# Patient Record
Sex: FEMALE | Race: WHITE
[De-identification: names, ages, dates, MRNs, and addresses within clinical notes are randomized per-mention and may not be internally consistent; named-entity substitution may affect disease eponyms.]

---

## 2020-10-22 ENCOUNTER — HOSPITAL ENCOUNTER (INPATIENT)
Dept: HOSPITAL 54 - ER | Age: 75
LOS: 6 days | Discharge: NURSING, REHAB OR LONG-TERM CARE | DRG: 177 | End: 2020-10-28
Attending: INTERNAL MEDICINE
Payer: MEDICARE

## 2020-10-22 VITALS — HEIGHT: 61 IN | WEIGHT: 157 LBS | BODY MASS INDEX: 29.64 KG/M2

## 2020-10-22 DIAGNOSIS — U07.1: Primary | ICD-10-CM

## 2020-10-22 DIAGNOSIS — E66.9: ICD-10-CM

## 2020-10-22 DIAGNOSIS — D72.819: ICD-10-CM

## 2020-10-22 DIAGNOSIS — J12.89: ICD-10-CM

## 2020-10-22 DIAGNOSIS — R73.9: ICD-10-CM

## 2020-10-22 DIAGNOSIS — K21.9: ICD-10-CM

## 2020-10-22 DIAGNOSIS — J96.01: ICD-10-CM

## 2020-10-22 DIAGNOSIS — E87.6: ICD-10-CM

## 2020-10-22 DIAGNOSIS — N17.0: ICD-10-CM

## 2020-10-22 DIAGNOSIS — I10: ICD-10-CM

## 2020-10-22 LAB
ALBUMIN SERPL BCP-MCNC: 3.5 G/DL (ref 3.4–5)
ALP SERPL-CCNC: 66 U/L (ref 46–116)
ALT SERPL W P-5'-P-CCNC: 36 U/L (ref 12–78)
AST SERPL W P-5'-P-CCNC: 25 U/L (ref 15–37)
BASOPHILS # BLD AUTO: 0 /CMM (ref 0–0.2)
BASOPHILS NFR BLD AUTO: 0.4 % (ref 0–2)
BILIRUB DIRECT SERPL-MCNC: 0.1 MG/DL (ref 0–0.2)
BILIRUB SERPL-MCNC: 0.6 MG/DL (ref 0.2–1)
BUN SERPL-MCNC: 25 MG/DL (ref 7–18)
CALCIUM SERPL-MCNC: 8.8 MG/DL (ref 8.5–10.1)
CHLORIDE SERPL-SCNC: 101 MMOL/L (ref 98–107)
CO2 SERPL-SCNC: 29 MMOL/L (ref 21–32)
CREAT SERPL-MCNC: 0.8 MG/DL (ref 0.6–1.3)
EOSINOPHIL NFR BLD AUTO: 0.1 % (ref 0–6)
GLUCOSE SERPL-MCNC: 123 MG/DL (ref 74–106)
HCT VFR BLD AUTO: 39 % (ref 33–45)
HGB BLD-MCNC: 13.4 G/DL (ref 11.5–14.8)
LIPASE SERPL-CCNC: 108 U/L (ref 73–393)
LYMPHOCYTES NFR BLD AUTO: 0.5 /CMM (ref 0.8–4.8)
LYMPHOCYTES NFR BLD AUTO: 12.6 % (ref 20–44)
MCHC RBC AUTO-ENTMCNC: 34 G/DL (ref 31–36)
MCV RBC AUTO: 89 FL (ref 82–100)
MONOCYTES NFR BLD AUTO: 0.5 /CMM (ref 0.1–1.3)
MONOCYTES NFR BLD AUTO: 11.7 % (ref 2–12)
NEUTROPHILS # BLD AUTO: 3 /CMM (ref 1.8–8.9)
NEUTROPHILS NFR BLD AUTO: 75.2 % (ref 43–81)
PLATELET # BLD AUTO: 185 /CMM (ref 150–450)
POTASSIUM SERPL-SCNC: 2.9 MMOL/L (ref 3.5–5.1)
PROT SERPL-MCNC: 7.7 G/DL (ref 6.4–8.2)
RBC # BLD AUTO: 4.4 MIL/UL (ref 4–5.2)
SODIUM SERPL-SCNC: 139 MMOL/L (ref 136–145)
WBC NRBC COR # BLD AUTO: 4 K/UL (ref 4.3–11)

## 2020-10-22 PROCEDURE — C9803 HOPD COVID-19 SPEC COLLECT: HCPCS

## 2020-10-22 PROCEDURE — G0378 HOSPITAL OBSERVATION PER HR: HCPCS

## 2020-10-22 RX ADMIN — POTASSIUM CHLORIDE SCH MLS/HR: 200 INJECTION, SOLUTION INTRAVENOUS at 22:30

## 2020-10-22 RX ADMIN — POTASSIUM CHLORIDE SCH MLS/HR: 200 INJECTION, SOLUTION INTRAVENOUS at 21:15

## 2020-10-22 NOTE — NUR
PT AAOX4. BIBRA C/O MIDSTERNAL CP/EPIGASTRIC PAIN. PT WAS SEEN AT [facility] 2 DAYS 
AGO AND WAS D/C'D FOR GERD. + PRODUCTIVE COUGH. RR EVEN AND UNLABORED. PT 
PLACED IN BED 6 ON MONITOR AND PULSE OX. VSS. NO ACUTE DISTRESS NOTED. MD AT 
BEDSIDE FOR EVAL.

## 2020-10-23 VITALS — DIASTOLIC BLOOD PRESSURE: 75 MMHG | SYSTOLIC BLOOD PRESSURE: 140 MMHG

## 2020-10-23 VITALS — SYSTOLIC BLOOD PRESSURE: 155 MMHG | DIASTOLIC BLOOD PRESSURE: 68 MMHG

## 2020-10-23 VITALS — DIASTOLIC BLOOD PRESSURE: 67 MMHG | SYSTOLIC BLOOD PRESSURE: 129 MMHG

## 2020-10-23 VITALS — DIASTOLIC BLOOD PRESSURE: 52 MMHG | SYSTOLIC BLOOD PRESSURE: 123 MMHG

## 2020-10-23 VITALS — SYSTOLIC BLOOD PRESSURE: 146 MMHG | DIASTOLIC BLOOD PRESSURE: 80 MMHG

## 2020-10-23 VITALS — DIASTOLIC BLOOD PRESSURE: 66 MMHG | SYSTOLIC BLOOD PRESSURE: 139 MMHG

## 2020-10-23 LAB
ALBUMIN SERPL BCP-MCNC: 3.2 G/DL (ref 3.4–5)
ALP SERPL-CCNC: 63 U/L (ref 46–116)
ALT SERPL W P-5'-P-CCNC: 34 U/L (ref 12–78)
AST SERPL W P-5'-P-CCNC: 22 U/L (ref 15–37)
BASOPHILS # BLD AUTO: 0 /CMM (ref 0–0.2)
BASOPHILS NFR BLD AUTO: 0 % (ref 0–2)
BILIRUB SERPL-MCNC: 0.4 MG/DL (ref 0.2–1)
BUN SERPL-MCNC: 25 MG/DL (ref 7–18)
CALCIUM SERPL-MCNC: 8.5 MG/DL (ref 8.5–10.1)
CHLORIDE SERPL-SCNC: 104 MMOL/L (ref 98–107)
CHOLEST SERPL-MCNC: 107 MG/DL (ref ?–200)
CO2 SERPL-SCNC: 28 MMOL/L (ref 21–32)
CREAT SERPL-MCNC: 0.8 MG/DL (ref 0.6–1.3)
EOSINOPHIL NFR BLD AUTO: 0.1 % (ref 0–6)
FERRITIN SERPL-MCNC: 258 NG/ML (ref 8–388)
GLUCOSE SERPL-MCNC: 149 MG/DL (ref 74–106)
HCT VFR BLD AUTO: 39 % (ref 33–45)
HDLC SERPL-MCNC: 32 MG/DL (ref 40–60)
HGB BLD-MCNC: 12.9 G/DL (ref 11.5–14.8)
LDLC SERPL DIRECT ASSAY-MCNC: 61 MG/DL (ref 0–99)
LYMPHOCYTES NFR BLD AUTO: 0.4 /CMM (ref 0.8–4.8)
LYMPHOCYTES NFR BLD AUTO: 18.7 % (ref 20–44)
MAGNESIUM SERPL-MCNC: 3.1 MG/DL (ref 1.8–2.4)
MCHC RBC AUTO-ENTMCNC: 33 G/DL (ref 31–36)
MCV RBC AUTO: 90 FL (ref 82–100)
MONOCYTES NFR BLD AUTO: 0.2 /CMM (ref 0.1–1.3)
MONOCYTES NFR BLD AUTO: 8.6 % (ref 2–12)
NEUTROPHILS # BLD AUTO: 1.7 /CMM (ref 1.8–8.9)
NEUTROPHILS NFR BLD AUTO: 72.6 % (ref 43–81)
PHOSPHATE SERPL-MCNC: 3.8 MG/DL (ref 2.5–4.9)
PLATELET # BLD AUTO: 169 /CMM (ref 150–450)
POTASSIUM SERPL-SCNC: 3.7 MMOL/L (ref 3.5–5.1)
PROT SERPL-MCNC: 7.3 G/DL (ref 6.4–8.2)
RBC # BLD AUTO: 4.27 MIL/UL (ref 4–5.2)
SODIUM SERPL-SCNC: 142 MMOL/L (ref 136–145)
TRIGL SERPL-MCNC: 72 MG/DL (ref 30–150)
TSH SERPL DL<=0.005 MIU/L-ACNC: 0.15 UIU/ML (ref 0.36–3.74)
WBC NRBC COR # BLD AUTO: 2.3 K/UL (ref 4.3–11)

## 2020-10-23 RX ADMIN — ATORVASTATIN CALCIUM SCH MG: 10 TABLET, FILM COATED ORAL at 21:17

## 2020-10-23 RX ADMIN — Medication SCH MG: at 16:57

## 2020-10-23 RX ADMIN — DEXAMETHASONE SODIUM PHOSPHATE SCH MG: 10 INJECTION INTRAMUSCULAR; INTRAVENOUS at 08:53

## 2020-10-23 RX ADMIN — DEXTROSE MONOHYDRATE SCH MLS/HR: 50 INJECTION, SOLUTION INTRAVENOUS at 21:16

## 2020-10-23 RX ADMIN — Medication PRN MG: at 20:15

## 2020-10-23 RX ADMIN — METOPROLOL TARTRATE SCH MG: 50 TABLET, FILM COATED ORAL at 21:16

## 2020-10-23 RX ADMIN — Medication SCH MG: at 08:53

## 2020-10-23 RX ADMIN — ENOXAPARIN SODIUM SCH MG: 40 INJECTION SUBCUTANEOUS at 08:54

## 2020-10-23 RX ADMIN — PANTOPRAZOLE SODIUM SCH MG: 40 TABLET, DELAYED RELEASE ORAL at 08:56

## 2020-10-23 NOTE — NUR
TELE/RN OPENING NOTES:



RECEIVED PT. AWAKE IN BED. A/OX3-4. ABLE TO VERBALIZE NEEDS. NO SOB NOTED. BREATHING EVEN 
AND UNLABORED. TELE READING OF SR WITH HR OF 99. IV SITE OF LEFT WRIST #20G, RAC #20G, 
INTACT AND PATENT. SAFETY PRECAUTION IN PLACE AND MAINTAINED AT ALL TIMES. BED IN LOWEST 
LOCKED POSITION, HOB ELEVATED, SIDE RAILS UP X 2, CALL LIGHT WITHIN REACH. WILL CONTINUE TO 
MONITOR ACCORDINGLY.

## 2020-10-23 NOTE — NUR
TELE RN CLOSING NOTES



PT AWAKE IN BED AT THIS.  PT REMAINED STABLE THROUGHOUT SHIFT. PT KEPT CLEAN AND DRY. ALL 
CARE, NEEDS, MEDICATIONS AND TREATMENT ADMINISTERED AS ANTICIPATED PER PROTOCOL. SAFETY 
PRECAUTION IN PLACE AND MAINTAINED AT ALL TIMES. BED IN LOWEST LOCKED POSITION, HOB 
ELEVATED, SIDE RAILS UP X 2, CALL LIGHT WITHIN REACH. WILL ENDORSE TO NIGHT SHIFT NURSE FOR 
MARCIE

## 2020-10-23 NOTE — NUR
TELE RN NOTES

ADMITTED FROM ER THIS 74Y.O. FEMALE,ALERT,ORIENTED X3-4,BREATHING NON LABORED,DENIES CHEST 
PAIN.DIAGNOSIS OF COVID 19 AS REVEALED BY COVID RAPID TEST DONE IN ER.NO SKIN 
ISSUES,AMBULATE WITH STEADY GAIT.WITH SALINE LOCK LEFT WRIST AND RIGHT AC INTACT AND 
PATENT,IF POTASSIUM INFUSING WELL VI IV PUMP,WITH K LEVEL OF 2/9.DROPLET ISOLATION 
INITIATED.PLACE ON O2 2 LITERS TO KEEP O2 SAT ABOVE 90%.CALL LIGHT IN REACH,NEEDS 
ANTICIPATED.

## 2020-10-23 NOTE — NUR
TELE RN OPENING NOTES



RECEIVED PT AWAKE IN BED AT THIS TIME. PT IS A/O X3-4. NO SOB NOTED, NO S/S OF ANY ACUTE 
DISTRESS NOTED.  NO C/O PAIN AT THIS TIME. PT ON EXTERNAL TEL CARDIAC MONITOR READING SR IN 
THE 70S. RESPIRATIONS ARE EVEN AND UNLABORED WITH EQUAL RISE AND FALL IN CHEST. IV ACCESS 
NOTED IN LEFT WRIST G#20, INTACT, PATENT AND FLUSHING WELL. SAFETY PRECAUTION IN PLACE AND 
MAINTAINED AT ALL TIMES. BED IN LOWEST LOCKED POSITION, HOB ELEVATED, SIDE RAILS UP X 2, 
CALL LIGHT WITHIN REACH. WILL CONTINUE TO MONITOR

## 2020-10-23 NOTE — NUR
TELE/RN NOTES:



PT. PAIN LEVEL IMPROVED. PAIN MANAGEMENT EFFECTIVE. VSS. WILL CONTINUE TO MONITOR 
ACCORDINGLY.

## 2020-10-23 NOTE — NUR
PT NOTED WITH HOME MEDICATIONS AT BEDSIDE. MEDICATIONS SIGNED AND SEND TO PHARMACY. WILL 
CONTINUE TO MONITOR

## 2020-10-23 NOTE — NUR
TELE/RN NOTES:



PT. COMPLAINED OF PAIN 8 OUT OF 10, ACHING WHENEVER SHE COUGHS. REQUESTS FOR PAIN MEDICINE. 
ADMINISTERED MORPHINE 2MG IVP. AS ORDERED FOR PAIN. VSS. BP: 140/75 HR:105. WILL CONTINUE TO 
MONITOR PT ACCORDINGLY.

## 2020-10-23 NOTE — NUR
TELE RN NOTES

FAIRLY RESTED,SALINE LOCK X2 REMAINS PATENT.AFEBRILE,NO SOB,ISOLATION DROPLET FOR COVID 
POSITIVE.IN NO ACUTE DISTRESS.

## 2020-10-24 VITALS — DIASTOLIC BLOOD PRESSURE: 70 MMHG | SYSTOLIC BLOOD PRESSURE: 142 MMHG

## 2020-10-24 VITALS — SYSTOLIC BLOOD PRESSURE: 120 MMHG | DIASTOLIC BLOOD PRESSURE: 58 MMHG

## 2020-10-24 VITALS — SYSTOLIC BLOOD PRESSURE: 133 MMHG | DIASTOLIC BLOOD PRESSURE: 60 MMHG

## 2020-10-24 VITALS — DIASTOLIC BLOOD PRESSURE: 66 MMHG | SYSTOLIC BLOOD PRESSURE: 135 MMHG

## 2020-10-24 LAB
ALBUMIN SERPL BCP-MCNC: 3.2 G/DL (ref 3.4–5)
ALP SERPL-CCNC: 62 U/L (ref 46–116)
ALT SERPL W P-5'-P-CCNC: 56 U/L (ref 12–78)
AST SERPL W P-5'-P-CCNC: 38 U/L (ref 15–37)
BASE EXCESS BLDA CALC-SCNC: 4.3 MMOL/L
BASOPHILS # BLD AUTO: 0 /CMM (ref 0–0.2)
BASOPHILS NFR BLD AUTO: 0.1 % (ref 0–2)
BILIRUB DIRECT SERPL-MCNC: 0.1 MG/DL (ref 0–0.2)
BILIRUB SERPL-MCNC: 0.5 MG/DL (ref 0.2–1)
BUN SERPL-MCNC: 25 MG/DL (ref 7–18)
CALCIUM SERPL-MCNC: 8.8 MG/DL (ref 8.5–10.1)
CHLORIDE SERPL-SCNC: 102 MMOL/L (ref 98–107)
CO2 SERPL-SCNC: 28 MMOL/L (ref 21–32)
CREAT SERPL-MCNC: 0.8 MG/DL (ref 0.6–1.3)
CRP SERPL-MCNC: 3.9 MG/DL (ref 0–0.9)
DO-HGB MFR BLDA: 45.7 MMHG
EOSINOPHIL NFR BLD AUTO: 0 % (ref 0–6)
FERRITIN SERPL-MCNC: 262 NG/ML (ref 8–388)
GLUCOSE SERPL-MCNC: 161 MG/DL (ref 74–106)
HCT VFR BLD AUTO: 38 % (ref 33–45)
HGB BLD-MCNC: 13 G/DL (ref 11.5–14.8)
INHALED O2 CONCENTRATION: 21 %
LYMPHOCYTES NFR BLD AUTO: 0.7 /CMM (ref 0.8–4.8)
LYMPHOCYTES NFR BLD AUTO: 11.4 % (ref 20–44)
MCHC RBC AUTO-ENTMCNC: 34 G/DL (ref 31–36)
MCV RBC AUTO: 91 FL (ref 82–100)
MONOCYTES NFR BLD AUTO: 0.6 /CMM (ref 0.1–1.3)
MONOCYTES NFR BLD AUTO: 8.6 % (ref 2–12)
NEUTROPHILS # BLD AUTO: 5.1 /CMM (ref 1.8–8.9)
NEUTROPHILS NFR BLD AUTO: 79.9 % (ref 43–81)
PCO2 TEMP ADJ BLDA: 38.9 MMHG (ref 35–45)
PH TEMP ADJ BLDA: 7.48 [PH] (ref 7.35–7.45)
PLATELET # BLD AUTO: 190 /CMM (ref 150–450)
PO2 TEMP ADJ BLDA: 57.4 MMHG (ref 75–100)
POTASSIUM SERPL-SCNC: 3.3 MMOL/L (ref 3.5–5.1)
PROT SERPL-MCNC: 7.5 G/DL (ref 6.4–8.2)
RBC # BLD AUTO: 4.24 MIL/UL (ref 4–5.2)
SAO2 % BLDA: 90.6 % (ref 92–98.5)
SODIUM SERPL-SCNC: 141 MMOL/L (ref 136–145)
VENTILATION MODE VENT: (no result)
WBC NRBC COR # BLD AUTO: 6.4 K/UL (ref 4.3–11)

## 2020-10-24 RX ADMIN — DEXTROSE MONOHYDRATE SCH MLS/HR: 50 INJECTION, SOLUTION INTRAVENOUS at 20:58

## 2020-10-24 RX ADMIN — PANTOPRAZOLE SODIUM SCH MG: 40 TABLET, DELAYED RELEASE ORAL at 08:30

## 2020-10-24 RX ADMIN — Medication SCH MG: at 08:30

## 2020-10-24 RX ADMIN — ENOXAPARIN SODIUM SCH MG: 40 INJECTION SUBCUTANEOUS at 08:38

## 2020-10-24 RX ADMIN — Medication SCH MG: at 17:55

## 2020-10-24 RX ADMIN — ATORVASTATIN CALCIUM SCH MG: 10 TABLET, FILM COATED ORAL at 21:05

## 2020-10-24 RX ADMIN — DEXAMETHASONE SODIUM PHOSPHATE SCH MG: 10 INJECTION INTRAMUSCULAR; INTRAVENOUS at 08:39

## 2020-10-24 RX ADMIN — LOSARTAN POTASSIUM SCH MG: 50 TABLET, FILM COATED ORAL at 08:30

## 2020-10-24 RX ADMIN — FUROSEMIDE SCH MG: 40 TABLET ORAL at 08:31

## 2020-10-24 RX ADMIN — ZOLPIDEM TARTRATE PRN MG: 5 TABLET, FILM COATED ORAL at 01:49

## 2020-10-24 RX ADMIN — DEXAMETHASONE SODIUM PHOSPHATE SCH MG: 10 INJECTION INTRAMUSCULAR; INTRAVENOUS at 08:30

## 2020-10-24 RX ADMIN — ZOLPIDEM TARTRATE PRN MG: 5 TABLET, FILM COATED ORAL at 22:56

## 2020-10-24 RX ADMIN — METOPROLOL TARTRATE SCH MG: 50 TABLET, FILM COATED ORAL at 08:30

## 2020-10-24 RX ADMIN — METOPROLOL TARTRATE SCH MG: 50 TABLET, FILM COATED ORAL at 21:05

## 2020-10-24 NOTE — NUR
RN CLOSED NOTES



PATIENT IS A/O X 3-4 WITH NO SIGNS OF ACUTE DISTRESS ON 3L OF NASAL CANNULA. NO SIGNS OF 
PAIN AT THIS MOMENT. IV L WRIST #20G AND R AC#20 G.  PATIENT KEPT CLEAN AND DRY. ALL NEEDS, 
CARE, TREATMENT AND MEDICATIONS ADMINISTERED AS ANTICIPATED PER ORDER. SAFETY MEASURES ARE 
APPLIED, BED IS LOW AND LOCKED POSITION. SIDE RAILS UP X 2 FOR SAFETY. CALL LIGHT WITHIN 
REACH. WILL ENDORSE TO THE NEXT NIGHT SHIFT.

## 2020-10-24 NOTE — NUR
TELE/RN CLOSING NOTES:



PT. REMAINS IN BED. A/OX3-4. ABLE TO VERBALIZE NEEDS. NO SOB NOTED. BREATHING EVEN AND 
UNLABORED. TELE READING OF SR WITH HR OF 90. IV SITE OF LEFT WRIST #20G, RAC #20G, INTACT 
AND PATENT. SAFETY PRECAUTION IN PLACE AND MAINTAINED AT ALL TIMES. BED IN LOWEST LOCKED 
POSITION, HOB ELEVATED, SIDE RAILS UP X 2, CALL LIGHT WITHIN REACH. ALL DUE MEDS GIVEN AS 
ORDERED. ALL NURSING NEEDS MET AND RENDERED. WILL ENDORSE TO DAY SHIFT RN FOR MARCIE.

## 2020-10-24 NOTE — NUR
Tele RN opening notes

Received Pt from morning nurse. Pt is resting in bed comfortably. Pt is alert and 
orientedX3. Respiration is normal in 3 L NC. No SOB. No S/S of distress noted. Tele monitor 
showed SR Hr at 72 bpm. IV sites at L wrist # 20 is clean, intact and flushes well. IV sites 
at RAC# 20 is clean, intact and flushes well, and SL. Per am nurse Pt is still waiting for 
convalescent plasma sign by Dr. Nickerson. Contact precautions is maintained. Safety 
precautions is maintained. Bed at low position, brakes locked, side rails upX2 and call 
light is within reach. Will continue to monitor.

## 2020-10-24 NOTE — NUR
Tele RN notes

Pt is having insomnia and requesting sleeping pill. Administered ambien 5 mg/po as ordered 
for sleeping per Pt's request. Safety precautions is maintained. Will continue to monitor.

## 2020-10-24 NOTE — NUR
PATIENT AWAKE IN BED. A/OX3-4. ABLE TO VERBALIZE NEEDS. NO SOB NOTED. BREATHING EVEN AND 
UNLABORED. TELE READING OF SR WITH HR OF 99. IV SITE OF LEFT WRIST #20G, RAC #20G, INTACT 
AND PATENT. SAFETY PRECAUTION IN PLACE AND MAINTAINED AT ALL TIMES. BED IN LOWEST LOCKED 
POSITION, HOB ELEVATED, SIDE RAILS UP X 2, CALL LIGHT WITHIN REACH. WILL CONTINUE TO MONITOR 
ACCORDINGLY.

## 2020-10-24 NOTE — NUR
TELE/RN NOTES:



PT REQUESTED FOR SOMETHING TO HELP SLEEP. DR. DELANEY ORDERED AMBIEN 5MG PO HS PRN. 
ADMINISTERED, AND TOLERATED WELL. VSS. WILL CONTINUE TO MONITOR ACCORDINGLY.

## 2020-10-24 NOTE — NUR
Tele RN notes

Pt's family named Jennifer called and asked about Pt's condition. Informed Jennifer that Pt's 
is resting and stable. Afebrile. No SOB. No S/S of distress noted. Pt's family Jennifer 
appreciate the info and verbalize understanding.

## 2020-10-24 NOTE — NUR
RECEIVED PATIENT FROM NILESH DALAL FOR MARCIE

PATIENT IS A/O X 3-4 WITH NO SIGNS OF ACUTE DISTRESS ON 2L OF NASAL CANNULA. TELE MONITOR SR 
NOTED. NO C/O OF PAIN AT THIS MOMENT. IV L WRIST #20G  AND R AC #20G INTACT.  SAFETY 
MEASURES ARE APPLIED, BED IS LOW AND LOCKED POSITION. SIDE RAILS UP X 2 FOR SAFETY. CALL 
LIGHT WITHIN REACH. WILL CONTINUE TO MONITOR.

## 2020-10-25 VITALS — SYSTOLIC BLOOD PRESSURE: 134 MMHG | DIASTOLIC BLOOD PRESSURE: 45 MMHG

## 2020-10-25 VITALS — DIASTOLIC BLOOD PRESSURE: 59 MMHG | SYSTOLIC BLOOD PRESSURE: 121 MMHG

## 2020-10-25 VITALS — SYSTOLIC BLOOD PRESSURE: 105 MMHG | DIASTOLIC BLOOD PRESSURE: 60 MMHG

## 2020-10-25 VITALS — DIASTOLIC BLOOD PRESSURE: 57 MMHG | SYSTOLIC BLOOD PRESSURE: 125 MMHG

## 2020-10-25 VITALS — SYSTOLIC BLOOD PRESSURE: 120 MMHG | DIASTOLIC BLOOD PRESSURE: 52 MMHG

## 2020-10-25 VITALS — DIASTOLIC BLOOD PRESSURE: 73 MMHG | SYSTOLIC BLOOD PRESSURE: 136 MMHG

## 2020-10-25 LAB
ALBUMIN SERPL BCP-MCNC: 3 G/DL (ref 3.4–5)
ALP SERPL-CCNC: 54 U/L (ref 46–116)
ALT SERPL W P-5'-P-CCNC: 51 U/L (ref 12–78)
AST SERPL W P-5'-P-CCNC: 34 U/L (ref 15–37)
BASOPHILS # BLD AUTO: 0 /CMM (ref 0–0.2)
BASOPHILS NFR BLD AUTO: 0.1 % (ref 0–2)
BILIRUB DIRECT SERPL-MCNC: 0.1 MG/DL (ref 0–0.2)
BILIRUB SERPL-MCNC: 0.3 MG/DL (ref 0.2–1)
BUN SERPL-MCNC: 25 MG/DL (ref 7–18)
CALCIUM SERPL-MCNC: 8.7 MG/DL (ref 8.5–10.1)
CHLORIDE SERPL-SCNC: 103 MMOL/L (ref 98–107)
CO2 SERPL-SCNC: 32 MMOL/L (ref 21–32)
CREAT SERPL-MCNC: 0.6 MG/DL (ref 0.6–1.3)
EOSINOPHIL NFR BLD AUTO: 0 % (ref 0–6)
GLUCOSE SERPL-MCNC: 131 MG/DL (ref 74–106)
HCT VFR BLD AUTO: 37 % (ref 33–45)
HGB BLD-MCNC: 12.4 G/DL (ref 11.5–14.8)
LYMPHOCYTES NFR BLD AUTO: 0.7 /CMM (ref 0.8–4.8)
LYMPHOCYTES NFR BLD AUTO: 15.1 % (ref 20–44)
MAGNESIUM SERPL-MCNC: 2.2 MG/DL (ref 1.8–2.4)
MCHC RBC AUTO-ENTMCNC: 34 G/DL (ref 31–36)
MCV RBC AUTO: 90 FL (ref 82–100)
MONOCYTES NFR BLD AUTO: 0.5 /CMM (ref 0.1–1.3)
MONOCYTES NFR BLD AUTO: 10.1 % (ref 2–12)
NEUTROPHILS # BLD AUTO: 3.6 /CMM (ref 1.8–8.9)
NEUTROPHILS NFR BLD AUTO: 74.7 % (ref 43–81)
PHOSPHATE SERPL-MCNC: 2.7 MG/DL (ref 2.5–4.9)
PLATELET # BLD AUTO: 181 /CMM (ref 150–450)
POTASSIUM SERPL-SCNC: 3.1 MMOL/L (ref 3.5–5.1)
PROT SERPL-MCNC: 7.1 G/DL (ref 6.4–8.2)
RBC # BLD AUTO: 4.06 MIL/UL (ref 4–5.2)
SODIUM SERPL-SCNC: 143 MMOL/L (ref 136–145)
WBC NRBC COR # BLD AUTO: 4.8 K/UL (ref 4.3–11)

## 2020-10-25 RX ADMIN — Medication PRN MG: at 22:48

## 2020-10-25 RX ADMIN — DEXAMETHASONE SODIUM PHOSPHATE SCH MG: 10 INJECTION INTRAMUSCULAR; INTRAVENOUS at 09:05

## 2020-10-25 RX ADMIN — ENOXAPARIN SODIUM SCH MG: 40 INJECTION SUBCUTANEOUS at 09:09

## 2020-10-25 RX ADMIN — Medication SCH MG: at 16:51

## 2020-10-25 RX ADMIN — PANTOPRAZOLE SODIUM SCH MG: 40 TABLET, DELAYED RELEASE ORAL at 09:10

## 2020-10-25 RX ADMIN — ATORVASTATIN CALCIUM SCH MG: 10 TABLET, FILM COATED ORAL at 21:11

## 2020-10-25 RX ADMIN — Medication PRN MG: at 02:16

## 2020-10-25 RX ADMIN — Medication SCH MG: at 09:05

## 2020-10-25 RX ADMIN — METOPROLOL TARTRATE SCH MG: 50 TABLET, FILM COATED ORAL at 09:07

## 2020-10-25 RX ADMIN — FUROSEMIDE SCH MG: 40 TABLET ORAL at 09:08

## 2020-10-25 RX ADMIN — POTASSIUM CHLORIDE SCH MEQ: 1500 TABLET, EXTENDED RELEASE ORAL at 09:06

## 2020-10-25 RX ADMIN — POTASSIUM CHLORIDE SCH MEQ: 1500 TABLET, EXTENDED RELEASE ORAL at 10:23

## 2020-10-25 RX ADMIN — LOSARTAN POTASSIUM SCH MG: 50 TABLET, FILM COATED ORAL at 09:08

## 2020-10-25 RX ADMIN — SODIUM CHLORIDE SCH MLS/HR: 9 INJECTION, SOLUTION INTRAVENOUS at 13:49

## 2020-10-25 RX ADMIN — METOPROLOL TARTRATE SCH MG: 50 TABLET, FILM COATED ORAL at 21:12

## 2020-10-25 NOTE — NUR
TELE RN NOTES



RECEIVED PATIENT AWAKE ALERT ORIENTED X4. NO SIGNS OF ACUTE RESPIRATORY DISTRESS NOTED,O2 AT 
2LPM VIA NASAL CANNULA TOLERATING WELL.  DENIES ANY PAIN OR DISCOMFORT AT THIS TIME, 
ASSISTED TO TOILET, AMBULATORY WITH STANDBY ASSIST. PERIPHERAL IV ACCESS INTACT AND PATENT 
ON HER LEFT WRIST AND RIGHT AC SL. TELE MONITOR READS SINUS RHYTHM. SAFETY MEASURES IN 
PLACE. ASPIRATION PRECAUTION EMPHASIZED. ALL NEEDS ANTICIPATED. WILL CONTINUE TO MONITOR 
ACCORDINGLY.

## 2020-10-25 NOTE — NUR
prn morphine:

pt c/o generalized pain 10/10 requesting for morphine, prn morphine 2mg ivp administered to 
pt at this time. will continue to monitor and reassess pt.

## 2020-10-25 NOTE — NUR
RN Opening note



Received patient in bed resting AO x 3-4, able to resods all stimuli. Pt does no c/o pain or 
discomfort at this time, skin is warm to touch, keep clean/dry intact IV site on left wrist 
20g with SL. Respiratory even and unlabored on room air. Kept locked bed with lowest 
position and elevated HOB for ensure airway. Call light within reach, will continue to 
monitor.

-------------------------------------------------------------------------------

Addendum: 10/25/20 at 1007 by JOANN GARCIA RN

-------------------------------------------------------------------------------

Error

## 2020-10-25 NOTE — NUR
RN Closing note



Patient in bed resting, does no appears pain pr distress, skin is warm to touch, keep 
clean/dry, intact IV site. Pt still waiting Plasma from blood bank for transfusion. 
Respiratory even and unlabored with oxygen at 2LPM, no respiratory distress observed. Kept 
locked bed with lowest position and elevated HOB for ensure airway and aspiration 
precaution. Niece/Jennifer wants to be on face sheet/person to notify and patient wish also 
but  is off today, will endorse night shift. Call light within reach, all needs 
met.

## 2020-10-25 NOTE — NUR
Tele RN closing notes

Pt is resting in bed comfortably. Pt is alert and orientedX3. Respiration is normal in 2 L 
NC. No SOB. No S/S of distress noted. VS is stable. Afebrile. Routine meds were given as 
ordered. Tele monitor showed SR 77.  IV sites at L wrist # 20 is clean, intact and flushes 
well. IV sites at RAC# 20 is clean, intact and flushes well, and SL.  Kept Pt clean, dry and 
comfortable. Contact precautions is maintained. Safety precautions is maintained. Bed at low 
position, brakes locked, side rails upX2, HOB elevated and call light is within reach. Will 
endorse to morning nurse for MARCIE.

## 2020-10-25 NOTE — NUR
RN Opening note



Received patient in bed resting AO x 3-4, able to resods all stimuli. Pt does no c/o pain or 
discomfort at this time, skin is warm to touch, keep clean/dry intact IV site on left wrist 
20g with SL. Respiratory even and unlabored with oxygen at 2LPM. Kept locked bed with lowest 
position and elevated HOB for ensure airway. Call light within reach, will continue to 
monitor.

## 2020-10-25 NOTE — NUR
Patient noticed potassium level 3.1, Dr. Nickerson made aware and received order potassium 
80mEq po x 1. Noted and carried out.

## 2020-10-26 VITALS — SYSTOLIC BLOOD PRESSURE: 124 MMHG | DIASTOLIC BLOOD PRESSURE: 68 MMHG

## 2020-10-26 VITALS — DIASTOLIC BLOOD PRESSURE: 64 MMHG | SYSTOLIC BLOOD PRESSURE: 127 MMHG

## 2020-10-26 VITALS — DIASTOLIC BLOOD PRESSURE: 73 MMHG | SYSTOLIC BLOOD PRESSURE: 129 MMHG

## 2020-10-26 VITALS — SYSTOLIC BLOOD PRESSURE: 121 MMHG | DIASTOLIC BLOOD PRESSURE: 87 MMHG

## 2020-10-26 VITALS — DIASTOLIC BLOOD PRESSURE: 70 MMHG | SYSTOLIC BLOOD PRESSURE: 123 MMHG

## 2020-10-26 VITALS — DIASTOLIC BLOOD PRESSURE: 60 MMHG | SYSTOLIC BLOOD PRESSURE: 133 MMHG

## 2020-10-26 VITALS — DIASTOLIC BLOOD PRESSURE: 68 MMHG | SYSTOLIC BLOOD PRESSURE: 124 MMHG

## 2020-10-26 VITALS — DIASTOLIC BLOOD PRESSURE: 59 MMHG | SYSTOLIC BLOOD PRESSURE: 152 MMHG

## 2020-10-26 LAB
ALBUMIN SERPL BCP-MCNC: 2.8 G/DL (ref 3.4–5)
ALP SERPL-CCNC: 52 U/L (ref 46–116)
ALT SERPL W P-5'-P-CCNC: 48 U/L (ref 12–78)
AST SERPL W P-5'-P-CCNC: 26 U/L (ref 15–37)
BASOPHILS # BLD AUTO: 0 /CMM (ref 0–0.2)
BASOPHILS NFR BLD AUTO: 0.1 % (ref 0–2)
BILIRUB DIRECT SERPL-MCNC: 0.1 MG/DL (ref 0–0.2)
BILIRUB SERPL-MCNC: 0.4 MG/DL (ref 0.2–1)
BUN SERPL-MCNC: 28 MG/DL (ref 7–18)
BUN SERPL-MCNC: 30 MG/DL (ref 7–18)
CALCIUM SERPL-MCNC: 8.6 MG/DL (ref 8.5–10.1)
CALCIUM SERPL-MCNC: 8.8 MG/DL (ref 8.5–10.1)
CHLORIDE SERPL-SCNC: 106 MMOL/L (ref 98–107)
CHLORIDE SERPL-SCNC: 107 MMOL/L (ref 98–107)
CO2 SERPL-SCNC: 30 MMOL/L (ref 21–32)
CO2 SERPL-SCNC: 30 MMOL/L (ref 21–32)
CREAT SERPL-MCNC: 0.5 MG/DL (ref 0.6–1.3)
CREAT SERPL-MCNC: 0.8 MG/DL (ref 0.6–1.3)
EOSINOPHIL NFR BLD AUTO: 0.3 % (ref 0–6)
GLUCOSE SERPL-MCNC: 105 MG/DL (ref 74–106)
GLUCOSE SERPL-MCNC: 161 MG/DL (ref 74–106)
HCT VFR BLD AUTO: 39 % (ref 33–45)
HGB BLD-MCNC: 13 G/DL (ref 11.5–14.8)
LYMPHOCYTES NFR BLD AUTO: 0.6 /CMM (ref 0.8–4.8)
LYMPHOCYTES NFR BLD AUTO: 11.6 % (ref 20–44)
MCHC RBC AUTO-ENTMCNC: 34 G/DL (ref 31–36)
MCV RBC AUTO: 90 FL (ref 82–100)
MONOCYTES NFR BLD AUTO: 0.4 /CMM (ref 0.1–1.3)
MONOCYTES NFR BLD AUTO: 7.6 % (ref 2–12)
NEUTROPHILS # BLD AUTO: 4.1 /CMM (ref 1.8–8.9)
NEUTROPHILS NFR BLD AUTO: 80.4 % (ref 43–81)
PLATELET # BLD AUTO: 182 /CMM (ref 150–450)
POTASSIUM SERPL-SCNC: 3.5 MMOL/L (ref 3.5–5.1)
POTASSIUM SERPL-SCNC: 3.7 MMOL/L (ref 3.5–5.1)
PROT SERPL-MCNC: 7 G/DL (ref 6.4–8.2)
RBC # BLD AUTO: 4.29 MIL/UL (ref 4–5.2)
SODIUM SERPL-SCNC: 142 MMOL/L (ref 136–145)
SODIUM SERPL-SCNC: 145 MMOL/L (ref 136–145)
WBC NRBC COR # BLD AUTO: 5.1 K/UL (ref 4.3–11)

## 2020-10-26 PROCEDURE — XW13325 TRANSFUSION OF CONVALESCENT PLASMA (NONAUTOLOGOUS) INTO PERIPHERAL VEIN, PERCUTANEOUS APPROACH, NEW TECHNOLOGY GROUP 5: ICD-10-PCS

## 2020-10-26 RX ADMIN — Medication SCH MG: at 17:00

## 2020-10-26 RX ADMIN — METOPROLOL TARTRATE SCH MG: 50 TABLET, FILM COATED ORAL at 23:53

## 2020-10-26 RX ADMIN — ATORVASTATIN CALCIUM SCH MG: 10 TABLET, FILM COATED ORAL at 23:53

## 2020-10-26 RX ADMIN — METOPROLOL TARTRATE SCH MG: 50 TABLET, FILM COATED ORAL at 08:40

## 2020-10-26 RX ADMIN — Medication SCH MG: at 08:39

## 2020-10-26 RX ADMIN — SODIUM CHLORIDE SCH MLS/HR: 9 INJECTION, SOLUTION INTRAVENOUS at 14:48

## 2020-10-26 RX ADMIN — DEXAMETHASONE SODIUM PHOSPHATE SCH MG: 10 INJECTION INTRAMUSCULAR; INTRAVENOUS at 08:39

## 2020-10-26 RX ADMIN — ZOLPIDEM TARTRATE PRN MG: 5 TABLET, FILM COATED ORAL at 23:53

## 2020-10-26 RX ADMIN — ENOXAPARIN SODIUM SCH MG: 40 INJECTION SUBCUTANEOUS at 08:41

## 2020-10-26 RX ADMIN — FUROSEMIDE SCH MG: 40 TABLET ORAL at 08:40

## 2020-10-26 RX ADMIN — LOSARTAN POTASSIUM SCH MG: 50 TABLET, FILM COATED ORAL at 08:40

## 2020-10-26 RX ADMIN — PANTOPRAZOLE SODIUM SCH MG: 40 TABLET, DELAYED RELEASE ORAL at 07:05

## 2020-10-26 RX ADMIN — ZOLPIDEM TARTRATE PRN MG: 5 TABLET, FILM COATED ORAL at 00:47

## 2020-10-26 RX ADMIN — Medication SCH MG: at 17:23

## 2020-10-26 NOTE — NUR
MS2/RN

TRANSFUSION FINISHED AT 2100, VITAL SIGNS STABLE, AFEBRILE, NO REACTION NOTED, WILL MONITOR.

## 2020-10-26 NOTE — NUR
TELE RN NOTES



ALL NEEDS ATTENDED AND MET. ABLE TO REST AND SLEPT AT INTERVALS,  PATIENTALERT ORIENTED X4. 
NO SIGNS OF ACUTE RESPIRATORY DISTRESS NOTED,O2 AT 2LPM VIA NASAL CANNULA TOLERATING WELL.  
DENIES ANY PAIN OR DISCOMFORT AT THIS TIME, ASSISTED TO TOILET, AMBULATORY WITH STANDBY 
ASSIST. PERIPHERAL IV ACCESS INTACT AND PATENT ON HER LEFT WRIST AND RIGHT AC SL. TELE 
MONITOR READS SINUS RHYTHM. SAFETY MEASURES IN PLACE. ASPIRATION PRECAUTION EMPHASIZED. ALL 
NEEDS ANTICIPATED. WILL ENDORSE TO AM NURSE FOR CONTINUITY OF CARE. FOR PLASMA TRANSFUSION 
ONCE AVAILABLE.

## 2020-10-26 NOTE — NUR
TELE RN CLOSING NOTES

Patient resting in bed. A/O x 3. VS stable with no acute distress. Breathing even and 
unlabored on 2LPM via NC with no respiratory distress. Denies pain. No signs and symptoms of 
pain. Telemonitor in place and patent. 20g PIV on left wrist intact, patent and flushing 
well. 20g PIV on RAC intact, patent and flushing well with Convalescent Plasma infusing at 
100ml/hr. Safety precautions in place. Bed locked and set to lowest position with side rails 
x 2 up. All needs rendered at this time. Call light within reach. Will endorse plan of care 
to oncoming shift.

## 2020-10-26 NOTE — NUR
MS2/RN

RECEIVED PATIENT AWAKE, ALERT, ORIENTED, COMFORTABLE, NO C/O PAIN, NO DISTRESS NOTED, 
CONVALESCENT PLASMA INFUSING, ASSISTED TO THE BATHROOM AND BACK TO BED, CALL LIGHT IN REACH. 
WILL MONITOR.

## 2020-10-26 NOTE — NUR
TELE RN OPENING NOTES

Received Patient resting in bed. A/O x 3. VS stable with no acute distress. Breathing even 
and unlabored on 2LPM via NC with no respiratory distress. Denies pain. No signs and 
symptoms of pain. Telemonitor in place and patent reading SR with HR-82. 20g PIV on left 
wrist intact, patent and flushing well. 20g PIV on RAC intact, patent and flushing well. 
Safety precautions in place. Bed locked and set to lowest position with side rails x 2 up. 
All needs rendered at this time. Call light within reach. Will continue to monitor.

## 2020-10-27 VITALS — SYSTOLIC BLOOD PRESSURE: 140 MMHG | DIASTOLIC BLOOD PRESSURE: 68 MMHG

## 2020-10-27 LAB
ALBUMIN SERPL BCP-MCNC: 2.9 G/DL (ref 3.4–5)
ALP SERPL-CCNC: 53 U/L (ref 46–116)
ALT SERPL W P-5'-P-CCNC: 41 U/L (ref 12–78)
AST SERPL W P-5'-P-CCNC: 21 U/L (ref 15–37)
BASOPHILS # BLD AUTO: 0.2 /CMM (ref 0–0.2)
BASOPHILS NFR BLD AUTO: 4.4 % (ref 0–2)
BILIRUB DIRECT SERPL-MCNC: 0.1 MG/DL (ref 0–0.2)
BILIRUB SERPL-MCNC: 0.5 MG/DL (ref 0.2–1)
BUN SERPL-MCNC: 28 MG/DL (ref 7–18)
CALCIUM SERPL-MCNC: 9 MG/DL (ref 8.5–10.1)
CHLORIDE SERPL-SCNC: 106 MMOL/L (ref 98–107)
CO2 SERPL-SCNC: 31 MMOL/L (ref 21–32)
CREAT SERPL-MCNC: 0.5 MG/DL (ref 0.6–1.3)
EOSINOPHIL NFR BLD AUTO: 4.9 % (ref 0–6)
GLUCOSE SERPL-MCNC: 107 MG/DL (ref 74–106)
HCT VFR BLD AUTO: 37 % (ref 33–45)
HGB BLD-MCNC: 12.4 G/DL (ref 11.5–14.8)
LYMPHOCYTES NFR BLD AUTO: 0.6 /CMM (ref 0.8–4.8)
LYMPHOCYTES NFR BLD AUTO: 12.6 % (ref 20–44)
MCHC RBC AUTO-ENTMCNC: 34 G/DL (ref 31–36)
MCV RBC AUTO: 90 FL (ref 82–100)
MONOCYTES NFR BLD AUTO: 0.2 /CMM (ref 0.1–1.3)
MONOCYTES NFR BLD AUTO: 4.7 % (ref 2–12)
NEUTROPHILS # BLD AUTO: 3.5 /CMM (ref 1.8–8.9)
NEUTROPHILS NFR BLD AUTO: 73.4 % (ref 43–81)
PLATELET # BLD AUTO: 193 /CMM (ref 150–450)
POTASSIUM SERPL-SCNC: 3 MMOL/L (ref 3.5–5.1)
PROT SERPL-MCNC: 7.1 G/DL (ref 6.4–8.2)
RBC # BLD AUTO: 4.1 MIL/UL (ref 4–5.2)
SODIUM SERPL-SCNC: 145 MMOL/L (ref 136–145)
WBC NRBC COR # BLD AUTO: 4.8 K/UL (ref 4.3–11)

## 2020-10-27 RX ADMIN — Medication SCH MG: at 16:48

## 2020-10-27 RX ADMIN — ENOXAPARIN SODIUM SCH MG: 40 INJECTION SUBCUTANEOUS at 09:21

## 2020-10-27 RX ADMIN — METOPROLOL TARTRATE SCH MG: 50 TABLET, FILM COATED ORAL at 09:00

## 2020-10-27 RX ADMIN — PANTOPRAZOLE SODIUM SCH MG: 40 TABLET, DELAYED RELEASE ORAL at 09:24

## 2020-10-27 RX ADMIN — DEXAMETHASONE SCH MG: 4 TABLET ORAL at 09:19

## 2020-10-27 RX ADMIN — FUROSEMIDE SCH MG: 40 TABLET ORAL at 09:19

## 2020-10-27 RX ADMIN — SODIUM CHLORIDE SCH MLS/HR: 9 INJECTION, SOLUTION INTRAVENOUS at 13:43

## 2020-10-27 RX ADMIN — METOPROLOL TARTRATE SCH MG: 50 TABLET, FILM COATED ORAL at 21:46

## 2020-10-27 RX ADMIN — LOSARTAN POTASSIUM SCH MG: 50 TABLET, FILM COATED ORAL at 09:00

## 2020-10-27 RX ADMIN — POTASSIUM CHLORIDE SCH MEQ: 1500 TABLET, EXTENDED RELEASE ORAL at 11:48

## 2020-10-27 RX ADMIN — POTASSIUM CHLORIDE SCH MEQ: 1500 TABLET, EXTENDED RELEASE ORAL at 09:19

## 2020-10-27 RX ADMIN — ATORVASTATIN CALCIUM SCH MG: 10 TABLET, FILM COATED ORAL at 21:45

## 2020-10-27 RX ADMIN — Medication SCH MG: at 09:24

## 2020-10-27 NOTE — NUR
RN OPENING NOTES



PATIENT RECEIVED RESTING IN BED A/O X 4. ON 2L OF O2 WITH BREATHING EVEN AND UNLABORED, NO 
SOB NOTED. NO SIGNS OF ACUTE DISTRESS. NO COMPLAINTS OF PAIN OR DISCOMFORT AT THE MOMENT. IV 
LOCATED ON R [facility]. SAFETY PRECAUTIONS IN PLACE WITH BED IN LOWEST POSITION, CALL LIGHT 
WITHIN REACH, BREAKS ON, SIDE RAILS UP. WILL CONTINUE TO MONITOR THROUGHOUT THE NIGHT.

## 2020-10-27 NOTE — NUR
MS2/RN

PATIENT IS SLEEPING AT THIS TIME, APPEAR COMFORTABLE, NO SIGNS OF DISTRESS NOTED, CALL LIGHT 
IN REACH, WILL CONTINUE TO MONITOR.

## 2020-10-27 NOTE — NUR
ms rn

was seen by dr. denney w/ order to go home today,but patient prefers to go to rehab,case 
manager notified.

## 2020-10-27 NOTE — NUR
spoke to the patient via patient's hospital line. Patient is alert and 
oriented x4, patient speaking slowly and uneven. Patient confirmed that Jennifer is patients 
eliz. SW confirmed with patient if information can be released to Jennifer, patient provided 
verbal consent yes. Patient informed this SW that Jennifer has been calling for updates 
prior to today and that patient has been in contact with Jennifer providing updates. SW asked 
patient if she is aware of discharge, patient stated that she is and was told that she is 
going to a skilled nursing facility. Patient is aware of discharge plan and in agreement of 
this plan. SW to follow-up with case management team regarding discharge and providing 
information to patient eliz Rodriguez [phone]. 



SW to remain available for all needs regarding this patient.

## 2020-10-27 NOTE — NUR
received a call from patient's eliz Rodriguez [phone]. Per Jennifer, 
patient is not ready to be discharged. SW to provide this message to case management to 
provide more information regarding patient's discharge plan.

## 2020-10-27 NOTE — NUR
ms rn

received on bed, awake,alert,oriented x 4,not in any form of distress, respirations even and 
unlabored,no sob noted, lungs have ronchi bilateraly,abdomen soft,positive bowel sounds, 
denies pain at this time,all needs attended.

## 2020-10-27 NOTE — NUR
MS2/RN

PATIENT IS STILL SLEEPING, EASILY AROUSABLE, NO  SIGNS OF DISTRESS NOTED, CALL LIGHT IN 
REACH, ALL NEEDS ATTENDED AT THIS TIME, WILL CONTINUE TO MONITOR.

## 2020-10-28 VITALS — SYSTOLIC BLOOD PRESSURE: 131 MMHG | DIASTOLIC BLOOD PRESSURE: 64 MMHG

## 2020-10-28 LAB
ALBUMIN SERPL BCP-MCNC: 2.8 G/DL (ref 3.4–5)
ALP SERPL-CCNC: 51 U/L (ref 46–116)
ALT SERPL W P-5'-P-CCNC: 38 U/L (ref 12–78)
AST SERPL W P-5'-P-CCNC: 20 U/L (ref 15–37)
BASOPHILS # BLD AUTO: 0 /CMM (ref 0–0.2)
BASOPHILS NFR BLD AUTO: 0.1 % (ref 0–2)
BILIRUB DIRECT SERPL-MCNC: 0.2 MG/DL (ref 0–0.2)
BILIRUB SERPL-MCNC: 0.6 MG/DL (ref 0.2–1)
BUN SERPL-MCNC: 23 MG/DL (ref 7–18)
CALCIUM SERPL-MCNC: 8.8 MG/DL (ref 8.5–10.1)
CHLORIDE SERPL-SCNC: 106 MMOL/L (ref 98–107)
CO2 SERPL-SCNC: 29 MMOL/L (ref 21–32)
CREAT SERPL-MCNC: 0.7 MG/DL (ref 0.6–1.3)
EOSINOPHIL NFR BLD AUTO: 1.8 % (ref 0–6)
GLUCOSE SERPL-MCNC: 192 MG/DL (ref 74–106)
HCT VFR BLD AUTO: 37 % (ref 33–45)
HGB BLD-MCNC: 12.2 G/DL (ref 11.5–14.8)
LYMPHOCYTES NFR BLD AUTO: 0.7 /CMM (ref 0.8–4.8)
LYMPHOCYTES NFR BLD AUTO: 13.3 % (ref 20–44)
MCHC RBC AUTO-ENTMCNC: 34 G/DL (ref 31–36)
MCV RBC AUTO: 90 FL (ref 82–100)
MONOCYTES NFR BLD AUTO: 0.4 /CMM (ref 0.1–1.3)
MONOCYTES NFR BLD AUTO: 8.1 % (ref 2–12)
NEUTROPHILS # BLD AUTO: 3.8 /CMM (ref 1.8–8.9)
NEUTROPHILS NFR BLD AUTO: 76.7 % (ref 43–81)
PLATELET # BLD AUTO: 185 /CMM (ref 150–450)
POTASSIUM SERPL-SCNC: 3.7 MMOL/L (ref 3.5–5.1)
PROT SERPL-MCNC: 6.7 G/DL (ref 6.4–8.2)
RBC # BLD AUTO: 4.03 MIL/UL (ref 4–5.2)
SODIUM SERPL-SCNC: 144 MMOL/L (ref 136–145)
WBC NRBC COR # BLD AUTO: 5 K/UL (ref 4.3–11)

## 2020-10-28 RX ADMIN — POTASSIUM CHLORIDE SCH MEQ: 1500 TABLET, EXTENDED RELEASE ORAL at 09:00

## 2020-10-28 RX ADMIN — FUROSEMIDE SCH MG: 40 TABLET ORAL at 08:56

## 2020-10-28 RX ADMIN — DEXAMETHASONE SCH MG: 4 TABLET ORAL at 08:56

## 2020-10-28 RX ADMIN — PANTOPRAZOLE SODIUM SCH MG: 40 TABLET, DELAYED RELEASE ORAL at 07:36

## 2020-10-28 RX ADMIN — ZOLPIDEM TARTRATE PRN MG: 5 TABLET, FILM COATED ORAL at 01:07

## 2020-10-28 RX ADMIN — Medication SCH MG: at 08:56

## 2020-10-28 RX ADMIN — ENOXAPARIN SODIUM SCH MG: 40 INJECTION SUBCUTANEOUS at 08:57

## 2020-10-28 RX ADMIN — SODIUM CHLORIDE SCH MLS/HR: 9 INJECTION, SOLUTION INTRAVENOUS at 13:23

## 2020-10-28 RX ADMIN — POTASSIUM CHLORIDE SCH MEQ: 1500 TABLET, EXTENDED RELEASE ORAL at 08:56

## 2020-10-28 RX ADMIN — LOSARTAN POTASSIUM SCH MG: 50 TABLET, FILM COATED ORAL at 08:58

## 2020-10-28 RX ADMIN — METOPROLOL TARTRATE SCH MG: 50 TABLET, FILM COATED ORAL at 08:56

## 2020-10-28 NOTE — NUR
RN CLOSING NOTES



PATIENT RESTING IN BED A/O X 4. ON 2L OF O2 WITH BREATHING EVEN AND UNLABORED, NO SOB NOTED. 
NO SIGNS OF ACUTE DISTRESS. NO COMPLAINTS OF PAIN OR DISCOMFORT AT THE MOMENT. IV LOCATED ON 
[facility]. SAFETY PRECAUTIONS IN PLACE WITH BED IN LOWEST POSITION, CALL LIGHT WITHIN REACH, 
BREAKS ON, SIDE RAILS UP. IV ALL NEEDS ATTENDED TO. WILL ENDORSE TO ONCOMING SHIFT ABOUT 
MARCIE.

## 2020-10-28 NOTE — NUR
MS RN NOTES



PATIENT FOR DISCHARGE. DISCHARGE INSTRUCTIONS/EDUCATION AND REPORT GIVEN TO REBECCA AT [facility] [phone].  ALERT AND ORIENTED X4. ON O2 AT 2L/MIN VIA NC CALISTA WELL. 
AMBULATORY WITH STEADY GAIT. DENIES ANY C/O PAIN NOR DISCOMFORT AT THIS TIME. PATIENT 
COMPLETED REMDESEVIR THERAPY WITHOUT S/S OF COMPLICATIONS. LEFT WRIST IV ACCESS REMOVED WITH 
CATHETER TIP INTACT. ALL BELONGINGS ACCOUNTED FOR. PATIENT PICKED UP BY AMBULANCE 
ACCOMPANIED BY 2 EMT AND LEFT IN STABLE CONDITION.

## 2020-10-28 NOTE — NUR
MS RN NOTES



RECEIVED PATIENT IN BED ASLEEP. AROUSABLE TO VERBAL AND TACTILE STIMULI. HOB ELEVATED. ALERT 
AND ORIENTED X4. ON O2 AT 2L/MIN VIA NC CALISTA WELL. AMBULATORY WITH STEADY GAIT. DENIES ANY 
C/O PAIN NOR DISCOMFORT AT THIS TIME. LEFT WRIST SL # 20 INTACT AND PATENT. BED IN LOWEST 
POSITION, LOCKED. BED ALARM ON. CALL LIGHT WITHIN REACH. ABLE TO VERBALIZE NEEDS.

## 2020-10-28 NOTE — NUR
MS/RN NOTES 



PATIENT REQUESTING FOR SLEEPING AID. PATIENT GIVEN AMBIEN 5 MG PO. PATIENT V/S ARE STABLE, 
PATIENT IN NO SIGNS OF DISTRESS. WILL CONTINUE TO MONITOR.

## 2025-02-26 ENCOUNTER — OFFICE (OUTPATIENT)
Dept: URBAN - METROPOLITAN AREA CLINIC 45 | Facility: CLINIC | Age: 80
End: 2025-02-26

## 2025-02-26 VITALS
HEIGHT: 60 IN | HEART RATE: 75 BPM | TEMPERATURE: 97.7 F | DIASTOLIC BLOOD PRESSURE: 69 MMHG | WEIGHT: 176 LBS | SYSTOLIC BLOOD PRESSURE: 120 MMHG

## 2025-02-26 DIAGNOSIS — K59.00 CONSTIPATION: ICD-10-CM

## 2025-02-26 DIAGNOSIS — K21.9 GERD: ICD-10-CM

## 2025-02-26 DIAGNOSIS — Z86.0100 PERSONAL HISTORY OF COLON POLYPS, UNSPECIFIED: ICD-10-CM

## 2025-02-26 DIAGNOSIS — R19.4 CHANGE IN BOWEL HABITS: ICD-10-CM

## 2025-02-26 DIAGNOSIS — R10.13 EPIGASTRIC PAIN: ICD-10-CM

## 2025-02-26 PROCEDURE — 99204 OFFICE O/P NEW MOD 45 MIN: CPT | Performed by: STUDENT IN AN ORGANIZED HEALTH CARE EDUCATION/TRAINING PROGRAM

## 2025-02-26 NOTE — SERVICEHPINOTES
79-year-old female who presents for EGD due to chronic acid reflux on chronic PPI and epigastric abdominal pain and colonoscopy for history of colon polyps, change in bowel habits with constipation for the last year.She denies dysphagia, nausea, vomiting. She reports weight gain. She has a sense of fullness and lower abdominal discomfort after she eats. She reports constipation for the last year. MiraLAX has been helpful. She also has a history of kidney cancer and has undergone a nephrectomy. She had a recent CT scan, we will try to obtain the results. She has no prior endoscopy but her last colonoscopy was more than 5 years ago with removal of colon polyps.